# Patient Record
Sex: FEMALE | URBAN - METROPOLITAN AREA
[De-identification: names, ages, dates, MRNs, and addresses within clinical notes are randomized per-mention and may not be internally consistent; named-entity substitution may affect disease eponyms.]

---
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
## 2023-07-09 NOTE — PROCEDURE: ISOTRETINOIN MONITORING
vomiting Kilograms Preamble Statement (Weight Entered In Details Tab): Reported Weight in kilograms:
